# Patient Record
Sex: FEMALE | Race: WHITE | NOT HISPANIC OR LATINO | Employment: OTHER | URBAN - METROPOLITAN AREA
[De-identification: names, ages, dates, MRNs, and addresses within clinical notes are randomized per-mention and may not be internally consistent; named-entity substitution may affect disease eponyms.]

---

## 2017-01-03 ENCOUNTER — GENERIC CONVERSION - ENCOUNTER (OUTPATIENT)
Dept: OTHER | Facility: OTHER | Age: 82
End: 2017-01-03

## 2017-02-15 ENCOUNTER — ALLSCRIPTS OFFICE VISIT (OUTPATIENT)
Dept: OTHER | Facility: OTHER | Age: 82
End: 2017-02-15

## 2017-02-28 ENCOUNTER — GENERIC CONVERSION - ENCOUNTER (OUTPATIENT)
Dept: OTHER | Facility: OTHER | Age: 82
End: 2017-02-28

## 2017-06-26 ENCOUNTER — ALLSCRIPTS OFFICE VISIT (OUTPATIENT)
Dept: OTHER | Facility: OTHER | Age: 82
End: 2017-06-26

## 2017-07-05 ENCOUNTER — GENERIC CONVERSION - ENCOUNTER (OUTPATIENT)
Dept: OTHER | Facility: OTHER | Age: 82
End: 2017-07-05

## 2017-07-06 ENCOUNTER — APPOINTMENT (OUTPATIENT)
Dept: LAB | Facility: CLINIC | Age: 82
End: 2017-07-06
Payer: COMMERCIAL

## 2017-07-06 ENCOUNTER — TRANSCRIBE ORDERS (OUTPATIENT)
Dept: LAB | Facility: CLINIC | Age: 82
End: 2017-07-06

## 2017-07-06 DIAGNOSIS — R93.6 ABNORMAL FINDINGS ON DIAGNOSTIC IMAGING OF LIMBS: ICD-10-CM

## 2017-07-06 DIAGNOSIS — M79.89 OTHER DISORDERS OF SOFT TISSUE: ICD-10-CM

## 2017-07-06 DIAGNOSIS — I10 ESSENTIAL (PRIMARY) HYPERTENSION: ICD-10-CM

## 2017-07-06 DIAGNOSIS — Z01.818 ENCOUNTER FOR OTHER PREPROCEDURAL EXAMINATION: ICD-10-CM

## 2017-07-06 LAB
BUN SERPL-MCNC: 18 MG/DL (ref 5–25)
CREAT SERPL-MCNC: 0.99 MG/DL (ref 0.6–1.3)
GFR SERPL CREATININE-BSD FRML MDRD: 53.2 ML/MIN/1.73SQ M

## 2017-07-06 PROCEDURE — 84520 ASSAY OF UREA NITROGEN: CPT

## 2017-07-06 PROCEDURE — 82565 ASSAY OF CREATININE: CPT

## 2017-07-06 PROCEDURE — 36415 COLL VENOUS BLD VENIPUNCTURE: CPT

## 2017-07-10 DIAGNOSIS — R10.30 LOWER ABDOMINAL PAIN: ICD-10-CM

## 2017-07-10 DIAGNOSIS — R19.09 OTHER INTRA-ABDOMINAL AND PELVIC SWELLING, MASS AND LUMP: ICD-10-CM

## 2017-07-10 DIAGNOSIS — R93.6 ABNORMAL FINDINGS ON DIAGNOSTIC IMAGING OF LIMBS: ICD-10-CM

## 2017-09-27 ENCOUNTER — ALLSCRIPTS OFFICE VISIT (OUTPATIENT)
Dept: OTHER | Facility: OTHER | Age: 82
End: 2017-09-27

## 2017-10-02 ENCOUNTER — TRANSCRIBE ORDERS (OUTPATIENT)
Dept: LAB | Facility: CLINIC | Age: 82
End: 2017-10-02

## 2017-10-02 ENCOUNTER — APPOINTMENT (OUTPATIENT)
Dept: LAB | Facility: CLINIC | Age: 82
End: 2017-10-02
Payer: COMMERCIAL

## 2017-10-02 DIAGNOSIS — E55.9 VITAMIN D DEFICIENCY: ICD-10-CM

## 2017-10-02 DIAGNOSIS — E78.5 HYPERLIPIDEMIA: ICD-10-CM

## 2017-10-02 DIAGNOSIS — M15.9 POLYOSTEOARTHRITIS: ICD-10-CM

## 2017-10-02 DIAGNOSIS — I10 ESSENTIAL (PRIMARY) HYPERTENSION: ICD-10-CM

## 2017-10-02 LAB
25(OH)D3 SERPL-MCNC: 40.3 NG/ML (ref 30–100)
ALBUMIN SERPL BCP-MCNC: 3.4 G/DL (ref 3.5–5)
ALP SERPL-CCNC: 98 U/L (ref 46–116)
ALT SERPL W P-5'-P-CCNC: 13 U/L (ref 12–78)
ANION GAP SERPL CALCULATED.3IONS-SCNC: 5 MMOL/L (ref 4–13)
AST SERPL W P-5'-P-CCNC: 18 U/L (ref 5–45)
BILIRUB SERPL-MCNC: 0.49 MG/DL (ref 0.2–1)
BUN SERPL-MCNC: 20 MG/DL (ref 5–25)
CALCIUM SERPL-MCNC: 9.1 MG/DL (ref 8.3–10.1)
CHLORIDE SERPL-SCNC: 106 MMOL/L (ref 100–108)
CHOLEST SERPL-MCNC: 154 MG/DL (ref 50–200)
CO2 SERPL-SCNC: 28 MMOL/L (ref 21–32)
CREAT SERPL-MCNC: 0.89 MG/DL (ref 0.6–1.3)
GFR SERPL CREATININE-BSD FRML MDRD: 59 ML/MIN/1.73SQ M
GLUCOSE P FAST SERPL-MCNC: 90 MG/DL (ref 65–99)
HDLC SERPL-MCNC: 69 MG/DL (ref 40–60)
LDLC SERPL CALC-MCNC: 61 MG/DL (ref 0–100)
POTASSIUM SERPL-SCNC: 4.6 MMOL/L (ref 3.5–5.3)
PROT SERPL-MCNC: 7.4 G/DL (ref 6.4–8.2)
SODIUM SERPL-SCNC: 139 MMOL/L (ref 136–145)
TRIGL SERPL-MCNC: 120 MG/DL

## 2017-10-02 PROCEDURE — 36415 COLL VENOUS BLD VENIPUNCTURE: CPT

## 2017-10-02 PROCEDURE — 82306 VITAMIN D 25 HYDROXY: CPT

## 2017-10-02 PROCEDURE — 80061 LIPID PANEL: CPT

## 2017-10-02 PROCEDURE — 80053 COMPREHEN METABOLIC PANEL: CPT

## 2017-10-03 ENCOUNTER — GENERIC CONVERSION - ENCOUNTER (OUTPATIENT)
Dept: OTHER | Facility: OTHER | Age: 82
End: 2017-10-03

## 2018-01-13 VITALS
HEIGHT: 61 IN | DIASTOLIC BLOOD PRESSURE: 80 MMHG | WEIGHT: 171.38 LBS | HEART RATE: 64 BPM | BODY MASS INDEX: 32.36 KG/M2 | SYSTOLIC BLOOD PRESSURE: 124 MMHG

## 2018-01-14 VITALS — DIASTOLIC BLOOD PRESSURE: 80 MMHG | HEART RATE: 64 BPM | SYSTOLIC BLOOD PRESSURE: 140 MMHG

## 2018-01-15 NOTE — RESULT NOTES
Discussion/Summary   Please let the patient's son notes her recent blood work was all normal and stable no change in her medications  I have ordered repeat blood work for 6 months from now with an appointment to follow to review  She does have an appointment tomorrow but does not need to come in as she was just in last week  Verified Results  (1) LIPID PANEL FASTING W DIRECT LDL REFLEX 02Oct2017 10:36AM Lisseth Miller Order Number: [id number]     Test Name Result Flag Reference   CHOLESTEROL 154 mg/dL     LDL CHOLESTEROL CALCULATED 61 mg/dL  0-100   - Patient Instructions: This is a fasting blood test  Water, black tea or black coffee only after 9:00pm the night before test   Drink 2 glasses of water the morning of test       Triglyceride:        Normal <150 mg/dl   Borderline High 150-199 mg/dl   High 200-499 mg/dl   Very High >499 mg/dl      Cholesterol:       Desirable <200 mg/dl    Borderline High 200-239 mg/dl    High >239 mg/dl      HDL Cholesterol:       High>59 mg/dL    Low <41 mg/dL      HDL Cholesterol:       High>59 mg/dL    Low <41 mg/dL      This screening LDL is a calculated result  It does not have the accuracy of the Direct Measured LDL in the monitoring of patients with hyperlipidemia and/or statin therapy  Direct Measure LDL (YOQ243) must be ordered separately in these patients  TRIGLYCERIDES 120 mg/dL  <=150   Specimen collection should occur prior to N-Acetylcysteine or Metamizole administration due to the potential for falsely depressed results  HDL,DIRECT 69 mg/dL H 40-60   Specimen collection should occur prior to Metamizole administration due to the potential for falsley depressed results       (1) COMPREHENSIVE METABOLIC PANEL [id number] 97:02GV Lisseth Miller Order Number: [id number]     Test Name Result Flag Reference   SODIUM 139 mmol/L  136-145   POTASSIUM 4 6 mmol/L  3 5-5 3   CHLORIDE 106 mmol/L  100-108   CARBON DIOXIDE 28 mmol/L 21-32   ANION GAP (CALC) 5 mmol/L  4-13   BLOOD UREA NITROGEN 20 mg/dL  5-25   CREATININE 0 89 mg/dL  0 60-1 30   Standardized to IDMS reference method   CALCIUM 9 1 mg/dL  8 3-10 1   BILI, TOTAL 0 49 mg/dL  0 20-1 00   ALK PHOSPHATAS 98 U/L     ALT (SGPT) 13 U/L  12-78   Specimen collection should occur prior to Sulfasalazine and/or Sulfapyridine administration due to the potential for falsely depressed results  AST(SGOT) 18 U/L  5-45   Specimen collection should occur prior to Sulfasalazine administration due to the potential for falsely depressed results  ALBUMIN 3 4 g/dL L 3 5-5 0   TOTAL PROTEIN 7 4 g/dL  6 4-8 2   eGFR 59 ml/min/1 73sq m     [facility] Disease Education Program recommendations are as follows:  GFR calculation is accurate only with a steady state creatinine  Chronic Kidney disease less than 60 ml/min/1 73 sq  meters  Kidney failure less than 15 ml/min/1 73 sq  meters  GLUCOSE FASTING 90 mg/dL  65-99   Specimen collection should occur prior to Sulfasalazine administration due to the potential for falsely depressed results  Specimen collection should occur prior to Sulfapyridine administration due to the potential for falsely elevated results  (1) VITAMIN D 25-HYDROXY 02Oct2017 10:36AM Lisandro Barnes Order Number: [id number]     Test Name Result Flag Reference   VIT D 25-HYDROX 40 3 ng/mL  30 0-100 0   This assay is a certified procedure of the CDC Vitamin D Standardization Certification Program (VDSCP)     Deficiency <20ng/ml   Insufficiency 20-30ng/ml   Sufficient  ng/ml     *Patients undergoing fluorescein dye angiography may retain small amounts of fluorescein in the body for 48-72 hours post procedure  Samples containing fluorescein can produce falsely elevated Vitamin D values  If the patient had this procedure, a specimen should be resubmitted post fluorescein clearance         Plan  A-fib, Hyperlipidemia, Hypertension, Vitamin D deficiency    · Follow-up visit in 6 months Evaluation and Treatment  Follow-up  Status: Hold For -  Scheduling  Requested for: [id number]   · (1) COMPREHENSIVE METABOLIC PANEL; Status:Active; Requested for:03Apr2018;    · (1) LIPID PANEL FASTING W DIRECT LDL REFLEX; Status:Active; Requested  for:03Apr2018;    · (1) VITAMIN D 25-HYDROXY; Status:Active;  Requested ZLT:[id number];     Signatures   Electronically signed by : Alcides Adams, [facility]; Oct  3 2017  2:38PM EST                       (Author)

## 2018-01-18 NOTE — MISCELLANEOUS
Message   Recorded as Task   Date: 12/12/2016 09:59 AM, Created By: Anya Philip   Task Name: Medical Complaint Callback   Assigned To: Guillermina Gar Patient: Valdo Chowdary, Status: Active   Comment:    Karyn Alfaro - 12 Dec 2016 9:59 AM     TASK CREATED  Caller: Pasquale Campbell, Adult Child; Medical Complaint  Patient was in the hospital over the weekend and son has questions re: meds, please call asap  [address] Dec 2016 10:31 AM     TASK IN PROGRESS   Marivel Iverson - 13 Dec 2016 10:37 AM     TASK REASSIGNED: Previously Assigned To Juan Jose and [address] Team  Son said they found A  Fib in the hospital  She was put on Eliqis and was told not to take Meloxicam anymore  He asked if there is something else she can take  However, when I asked if she was in any pain with her OA he said no  Please advise  Guillermina Hinson - 13 Dec 2016 10:37 AM     TASK EDITED   Joellen Marlow - 13 Dec 2016 2:09 PM     TASK REPLIED TO: Previously Assigned To Marivel Iverson    No She should not take any NSAIDS  She can use tramadol as needed  We have nothing in her chart about ER/hospital can we obtain records from where ever she went? Guillermina Hinson - 13 Dec 2016 3:39 PM     TASK EDITED  Son aware  He said she will call if she decided she needs something for pain  Active Problems    1  Allergic rhinitis (477 9) (J30 9)   2  C  difficile diarrhea (008 45) (A04 7)   3  Generalized OA (715 00) (M15 9)   4  Hyperlipidemia (272 4) (E78 5)   5  Hypernatremia (276 0) (E87 0)   6  Hypertension (401 9) (I10)   7  Hypokalemia (276 8) (E87 6)   8  Norovirus (008 63) (A08 11)   9  Paresthesias (782 0) (R20 2)   10  Vitamin D deficiency (268 9) (E55 9)    Current Meds   1  Aspirin 81 MG TABS; Take 1 tablet daily; Therapy: 13Jtd8824 to Recorded   2  D 1000 1000 UNIT Oral Capsule; TAKE 3 CAPSULE Daily; Therapy: 87ZDC4566 to (Last Rx:04Kdy6696) Ordered   3  Glucosamine Chondroit-Collagen Oral Capsule; Therapy: 29HJC7731 to Recorded   4  Meloxicam 15 MG Oral Tablet; Take 1 tablet daily; Therapy: 77XKE8406 to (Last Rx:21Nov2016)  Requested for: 21Nov2016 Ordered   5  Quinapril HCl - 40 MG Oral Tablet; TAKE 1 TABLET DAILY; Therapy: 02HXO1294 to (Evaluate:08Apr2017)  Requested for: 01ORG2584; Last   Rx:10Oct2016 Ordered   6  Simvastatin 20 MG Oral Tablet; Take 1 tablet by mouth at bedtime; Therapy: 77OTT2876 to (Evaluate:08Apr2017)  Requested for: 18GTK1913; Last   Rx:10Oct2016 Ordered    Allergies    1   No Known Drug Allergies    Signatures   Electronically signed by : Kenia Zuñiga, ; Dec 13 2016  3:40PM EST                       (Author)

## 2018-04-03 DIAGNOSIS — I48.91 ATRIAL FIBRILLATION (HCC): ICD-10-CM

## 2018-04-03 DIAGNOSIS — E78.5 HYPERLIPIDEMIA: ICD-10-CM

## 2018-04-03 DIAGNOSIS — E55.9 VITAMIN D DEFICIENCY: ICD-10-CM

## 2018-04-03 DIAGNOSIS — I10 ESSENTIAL (PRIMARY) HYPERTENSION: ICD-10-CM
